# Patient Record
Sex: MALE | Race: OTHER | NOT HISPANIC OR LATINO | URBAN - METROPOLITAN AREA
[De-identification: names, ages, dates, MRNs, and addresses within clinical notes are randomized per-mention and may not be internally consistent; named-entity substitution may affect disease eponyms.]

---

## 2018-06-20 ENCOUNTER — EMERGENCY (EMERGENCY)
Facility: HOSPITAL | Age: 33
LOS: 1 days | Discharge: HOME | End: 2018-06-20
Attending: EMERGENCY MEDICINE | Admitting: EMERGENCY MEDICINE
Payer: COMMERCIAL

## 2018-06-20 VITALS
RESPIRATION RATE: 18 BRPM | DIASTOLIC BLOOD PRESSURE: 77 MMHG | HEART RATE: 68 BPM | SYSTOLIC BLOOD PRESSURE: 118 MMHG | TEMPERATURE: 98 F | OXYGEN SATURATION: 97 % | WEIGHT: 154.98 LBS

## 2018-06-20 DIAGNOSIS — F10.129 ALCOHOL ABUSE WITH INTOXICATION, UNSPECIFIED: ICD-10-CM

## 2018-06-20 DIAGNOSIS — R41.82 ALTERED MENTAL STATUS, UNSPECIFIED: ICD-10-CM

## 2018-06-20 DIAGNOSIS — F17.200 NICOTINE DEPENDENCE, UNSPECIFIED, UNCOMPLICATED: ICD-10-CM

## 2018-06-20 PROCEDURE — 82962 GLUCOSE BLOOD TEST: CPT

## 2018-06-20 PROCEDURE — 99053 MED SERV 10PM-8AM 24 HR FAC: CPT

## 2018-06-20 PROCEDURE — 99284 EMERGENCY DEPT VISIT MOD MDM: CPT | Mod: 25

## 2018-06-20 PROCEDURE — 99285 EMERGENCY DEPT VISIT HI MDM: CPT

## 2018-06-20 NOTE — ED ADULT TRIAGE NOTE - CHIEF COMPLAINT QUOTE
pt BIBA for AMS secondary to public intoxication [facility]DEVENDR[facility] called EMS as bystander called in concern patient was stumbling. Pt awake alert admits to drinking denies drug use pain trauma/ injury no trauma noted

## 2018-06-20 NOTE — ED ADULT NURSE NOTE - OBJECTIVE STATEMENT
34 y/o male with no significant medical hx was BIBA for altered mental status secondary to etoh intoxication. Upon assessment, AOB, abdomen soft, lung fields WNL, breathing is equal and unlabored, pulses palpable, no visible injuries noted, patient ambulating with steady gait. Pt denies chest pain, headache, dizziness, blurred vision, slurred speech, nausea, vomiting, diarrhea, fever, chills, LOC, SOB, weakness, fatigue. Care in progress

## 2018-06-20 NOTE — ED PROVIDER NOTE - MEDICAL DECISION MAKING DETAILS
Pt biba w etoh intox. Awake, alert, ambulatory w steady gait.  FS, vs wnl.  Will dc.  Pt escorted to cab.

## 2022-11-29 NOTE — ED ADULT TRIAGE NOTE - NSWEIGHTCALCTOOLDRUG_GEN_A_CORE
Subjective    Patient reports currently pain in her right thigh and hamstring is a 3/10 after receiving IV morphine this AM. She states that before hand, pain in her back was a 3/10 and pain in her RLE was a 10/10. She also reports some new intermittent tingling of right foot that started yesterday. No other new or worsening symptoms.    Last Recorded Vitals  Blood pressure 113/76, pulse 89, temperature 97.5 °F (36.4 °C), temperature source Oral, resp. rate 17, height 5' 6\" (1.676 m), weight 93.9 kg (207 lb 0.2 oz), SpO2 98 %.  Body mass index is 33.41 kg/m².    Physical Exam    Lying in bed, NAD  Alert and oriented x 3 to person, place, and time  Speech clear  Facial expressions symmetrical  Bilateral lower extremity strength 5/5 with hip flexion, knee extension, dorsiflexion, and plantar flexion  Sensation grossly intact in lower extremities    Labs     Last WBC:  WBC (K/mcL)   Date Value   11/29/2022 8.4     LAST RBC:  RBC (mil/mcL)   Date Value   11/29/2022 4.37     LAST HCT:  HCT (%)   Date Value   11/29/2022 38.3     LAST HGB:  HGB (g/dL)   Date Value   11/29/2022 12.7     LAST PLT:  PLT (K/mcL)   Date Value   11/29/2022 231     LAST SODIUM:  Sodium (mmol/L)   Date Value   11/29/2022 135       Assessment & Plan    30 YO female with right L4-5 disc herniation     Patient is neurologically intact  Patient scheduled for a right L4-5 laminectomy and microdiscectomy later on today with Dr. Tian  Continue PT/OT   Continue oral pain medications  Please obtain consent  Maintain NPO status as per order     Will discuss further with attending    Reba Timmons PA-C          used